# Patient Record
Sex: MALE | Race: WHITE | Employment: FULL TIME | URBAN - METROPOLITAN AREA
[De-identification: names, ages, dates, MRNs, and addresses within clinical notes are randomized per-mention and may not be internally consistent; named-entity substitution may affect disease eponyms.]

---

## 2017-07-17 PROBLEM — M17.0 PRIMARY OSTEOARTHRITIS OF BOTH KNEES: Status: ACTIVE | Noted: 2017-07-17

## 2017-09-20 PROBLEM — Z96.651 STATUS POST TOTAL RIGHT KNEE REPLACEMENT: Status: ACTIVE | Noted: 2017-09-20

## 2018-09-13 ENCOUNTER — TELEPHONE (OUTPATIENT)
Dept: ORTHOPEDIC SURGERY | Age: 63
End: 2018-09-13

## 2018-09-13 NOTE — TELEPHONE ENCOUNTER
Patient's wife called. Lazarus Peacock is having prostate biopsy next week and she is checking to make sure he does not need an antibiotic prior to procedure due to Hx of TKA.

## 2018-09-28 ENCOUNTER — HOSPITAL ENCOUNTER (OUTPATIENT)
Age: 63
Discharge: HOME | End: 2018-09-30

## 2018-09-28 PROCEDURE — 88342 IMHCHEM/IMCYTCHM 1ST ANTB: CPT

## 2018-09-28 PROCEDURE — 88305 TISSUE EXAM BY PATHOLOGIST: CPT

## 2018-09-28 PROCEDURE — 88341 IMHCHEM/IMCYTCHM EA ADD ANTB: CPT

## 2019-06-03 ENCOUNTER — APPOINTMENT (OUTPATIENT)
Dept: CT IMAGING | Age: 64
End: 2019-06-03
Payer: COMMERCIAL

## 2019-06-03 ENCOUNTER — APPOINTMENT (OUTPATIENT)
Dept: GENERAL RADIOLOGY | Age: 64
End: 2019-06-03
Payer: COMMERCIAL

## 2019-06-03 ENCOUNTER — HOSPITAL ENCOUNTER (EMERGENCY)
Age: 64
Discharge: HOME | End: 2019-06-03
Attending: EMERGENCY MEDICINE
Payer: COMMERCIAL

## 2019-06-03 VITALS
HEIGHT: 67 IN | HEART RATE: 49 BPM | WEIGHT: 197 LBS | TEMPERATURE: 96.8 F | BODY MASS INDEX: 30.92 KG/M2 | RESPIRATION RATE: 18 BRPM | OXYGEN SATURATION: 97 % | DIASTOLIC BLOOD PRESSURE: 82 MMHG | SYSTOLIC BLOOD PRESSURE: 160 MMHG

## 2019-06-03 DIAGNOSIS — H81.10 BENIGN PAROXYSMAL POSITIONAL VERTIGO, UNSPECIFIED LATERALITY: Primary | ICD-10-CM

## 2019-06-03 DIAGNOSIS — R55 NEAR SYNCOPE: ICD-10-CM

## 2019-06-03 LAB
ALBUMIN SERPL-MCNC: 4.6 G/DL (ref 3.5–5.2)
ALP BLD-CCNC: 56 U/L (ref 40–129)
ALT SERPL-CCNC: 26 U/L (ref 0–40)
ANION GAP SERPL CALCULATED.3IONS-SCNC: 10 MMOL/L (ref 7–16)
AST SERPL-CCNC: 28 U/L (ref 0–39)
BASOPHILS ABSOLUTE: 0.05 E9/L (ref 0–0.2)
BASOPHILS RELATIVE PERCENT: 0.8 % (ref 0–2)
BILIRUB SERPL-MCNC: 0.3 MG/DL (ref 0–1.2)
BUN BLDV-MCNC: 23 MG/DL (ref 8–23)
CALCIUM SERPL-MCNC: 9.6 MG/DL (ref 8.6–10.2)
CHLORIDE BLD-SCNC: 104 MMOL/L (ref 98–107)
CHOLESTEROL, TOTAL: 158 MG/DL (ref 0–199)
CO2: 26 MMOL/L (ref 22–29)
CREAT SERPL-MCNC: 0.8 MG/DL (ref 0.7–1.2)
EKG ATRIAL RATE: 53 BPM
EKG P AXIS: 31 DEGREES
EKG P-R INTERVAL: 218 MS
EKG Q-T INTERVAL: 398 MS
EKG QRS DURATION: 94 MS
EKG QTC CALCULATION (BAZETT): 373 MS
EKG R AXIS: 10 DEGREES
EKG T AXIS: 50 DEGREES
EKG VENTRICULAR RATE: 53 BPM
EOSINOPHILS ABSOLUTE: 0.24 E9/L (ref 0.05–0.5)
EOSINOPHILS RELATIVE PERCENT: 4 % (ref 0–6)
GFR AFRICAN AMERICAN: >60
GFR AFRICAN AMERICAN: >60
GFR NON-AFRICAN AMERICAN: >60 ML/MIN/1.73
GFR NON-AFRICAN AMERICAN: >60 ML/MIN/1.73
GLUCOSE BLD-MCNC: 123 MG/DL (ref 74–99)
GLUCOSE BLD-MCNC: 123 MG/DL (ref 74–99)
HBA1C MFR BLD: 5.9 % (ref 4–5.6)
HCT VFR BLD CALC: 41.5 % (ref 37–54)
HDLC SERPL-MCNC: 52 MG/DL
HEMOGLOBIN: 14.6 G/DL (ref 12.5–16.5)
IMMATURE GRANULOCYTES #: 0.02 E9/L
IMMATURE GRANULOCYTES %: 0.3 % (ref 0–5)
LDL CHOLESTEROL CALCULATED: 73 MG/DL (ref 0–99)
LYMPHOCYTES ABSOLUTE: 1.68 E9/L (ref 1.5–4)
LYMPHOCYTES RELATIVE PERCENT: 27.9 % (ref 20–42)
MCH RBC QN AUTO: 31.5 PG (ref 26–35)
MCHC RBC AUTO-ENTMCNC: 35.2 % (ref 32–34.5)
MCV RBC AUTO: 89.6 FL (ref 80–99.9)
MONOCYTES ABSOLUTE: 0.47 E9/L (ref 0.1–0.95)
MONOCYTES RELATIVE PERCENT: 7.8 % (ref 2–12)
NEUTROPHILS ABSOLUTE: 3.57 E9/L (ref 1.8–7.3)
NEUTROPHILS RELATIVE PERCENT: 59.2 % (ref 43–80)
PDW BLD-RTO: 11.8 FL (ref 11.5–15)
PERFORMED ON: ABNORMAL
PLATELET # BLD: 175 E9/L (ref 130–450)
PMV BLD AUTO: 9.9 FL (ref 7–12)
POC CHLORIDE: 109 MMOL/L (ref 100–108)
POC CREATININE: 0.7 MG/DL (ref 0.7–1.2)
POC POTASSIUM: 4 MMOL/L (ref 3.5–5)
POC SODIUM: 142 MMOL/L (ref 132–146)
POTASSIUM SERPL-SCNC: 4.1 MMOL/L (ref 3.5–5)
RBC # BLD: 4.63 E12/L (ref 3.8–5.8)
SODIUM BLD-SCNC: 140 MMOL/L (ref 132–146)
T4 FREE: 1.01 NG/DL (ref 0.93–1.7)
TOTAL PROTEIN: 7.5 G/DL (ref 6.4–8.3)
TRIGL SERPL-MCNC: 164 MG/DL (ref 0–149)
TROPONIN: <0.01 NG/ML (ref 0–0.03)
TSH SERPL DL<=0.05 MIU/L-ACNC: 1.71 UIU/ML (ref 0.27–4.2)
VLDLC SERPL CALC-MCNC: 33 MG/DL
WBC # BLD: 6 E9/L (ref 4.5–11.5)

## 2019-06-03 PROCEDURE — 99285 EMERGENCY DEPT VISIT HI MDM: CPT

## 2019-06-03 PROCEDURE — 36415 COLL VENOUS BLD VENIPUNCTURE: CPT

## 2019-06-03 PROCEDURE — 70498 CT ANGIOGRAPHY NECK: CPT

## 2019-06-03 PROCEDURE — 84484 ASSAY OF TROPONIN QUANT: CPT

## 2019-06-03 PROCEDURE — 93010 ELECTROCARDIOGRAM REPORT: CPT | Performed by: INTERNAL MEDICINE

## 2019-06-03 PROCEDURE — 71045 X-RAY EXAM CHEST 1 VIEW: CPT

## 2019-06-03 PROCEDURE — 6370000000 HC RX 637 (ALT 250 FOR IP): Performed by: EMERGENCY MEDICINE

## 2019-06-03 PROCEDURE — 93005 ELECTROCARDIOGRAM TRACING: CPT | Performed by: EMERGENCY MEDICINE

## 2019-06-03 PROCEDURE — 84132 ASSAY OF SERUM POTASSIUM: CPT

## 2019-06-03 PROCEDURE — 80061 LIPID PANEL: CPT

## 2019-06-03 PROCEDURE — 80053 COMPREHEN METABOLIC PANEL: CPT

## 2019-06-03 PROCEDURE — 82947 ASSAY GLUCOSE BLOOD QUANT: CPT

## 2019-06-03 PROCEDURE — 84443 ASSAY THYROID STIM HORMONE: CPT

## 2019-06-03 PROCEDURE — 84439 ASSAY OF FREE THYROXINE: CPT

## 2019-06-03 PROCEDURE — 84295 ASSAY OF SERUM SODIUM: CPT

## 2019-06-03 PROCEDURE — 70450 CT HEAD/BRAIN W/O DYE: CPT

## 2019-06-03 PROCEDURE — 83036 HEMOGLOBIN GLYCOSYLATED A1C: CPT

## 2019-06-03 PROCEDURE — 2580000003 HC RX 258: Performed by: EMERGENCY MEDICINE

## 2019-06-03 PROCEDURE — 82565 ASSAY OF CREATININE: CPT

## 2019-06-03 PROCEDURE — 2580000003 HC RX 258: Performed by: RADIOLOGY

## 2019-06-03 PROCEDURE — 6360000004 HC RX CONTRAST MEDICATION: Performed by: RADIOLOGY

## 2019-06-03 PROCEDURE — 85025 COMPLETE CBC W/AUTO DIFF WBC: CPT

## 2019-06-03 PROCEDURE — 70496 CT ANGIOGRAPHY HEAD: CPT

## 2019-06-03 PROCEDURE — 82435 ASSAY OF BLOOD CHLORIDE: CPT

## 2019-06-03 RX ORDER — MECLIZINE HCL 12.5 MG/1
25 TABLET ORAL ONCE
Status: COMPLETED | OUTPATIENT
Start: 2019-06-03 | End: 2019-06-03

## 2019-06-03 RX ORDER — SODIUM CHLORIDE 0.9 % (FLUSH) 0.9 %
10 SYRINGE (ML) INJECTION ONCE
Status: COMPLETED | OUTPATIENT
Start: 2019-06-03 | End: 2019-06-03

## 2019-06-03 RX ORDER — 0.9 % SODIUM CHLORIDE 0.9 %
1000 INTRAVENOUS SOLUTION INTRAVENOUS ONCE
Status: COMPLETED | OUTPATIENT
Start: 2019-06-03 | End: 2019-06-03

## 2019-06-03 RX ORDER — MECLIZINE HYDROCHLORIDE 25 MG/1
25 TABLET ORAL 3 TIMES DAILY PRN
Qty: 30 TABLET | Refills: 0 | Status: SHIPPED | OUTPATIENT
Start: 2019-06-03 | End: 2019-06-13

## 2019-06-03 RX ADMIN — IOPAMIDOL 60 ML: 755 INJECTION, SOLUTION INTRAVENOUS at 11:43

## 2019-06-03 RX ADMIN — SODIUM CHLORIDE 1000 ML: 9 INJECTION, SOLUTION INTRAVENOUS at 10:41

## 2019-06-03 RX ADMIN — MECLIZINE 25 MG: 12.5 TABLET ORAL at 10:10

## 2019-06-03 RX ADMIN — Medication 10 ML: at 11:43

## 2019-06-03 NOTE — ED NOTES
Bed: 11  Expected date:   Expected time:   Means of arrival:   Comments:     Tracy Johnson RN  06/03/19 0034

## 2019-06-03 NOTE — ED NOTES
ATTENDING PROVIDER ATTESTATION:     Tea Durant presented to the emergency department for evaluation of Loss of Consciousness (near syncope at work)   and was initially evaluated by the Medical Resident. See Original ED Note for H&P and ED course above. I have reviewed and discussed the case, including pertinent history (medical, surgical, family and social) and exam findings with the Medical Resident assigned to Tea Durant. I have personally performed and/or participated in the history, exam, medical decision making, and procedures and agree with all pertinent clinical information and any additional changes or corrections are noted below in my assessment and plan. I have discussed this patient in detail with the resident, and provided the instruction and education,       I have reviewed my findings and recommendations with the assigned Medical Resident, Tea Durant and members of family present at the time of disposition. Review of Systems:   Pertinent positives and negatives are stated within HPI, all other systems reviewed and are negative.    --------------------------------------------- PAST HISTORY ---------------------------------------------  Past Medical History:  has a past medical history of Arthritis, Hyperlipidemia, Hypertension, Motion sickness, Seasonal allergies, and Spondylisthesis. Past Surgical History:  has a past surgical history that includes knee surgery (Right, 1971); Achilles tendon surgery (Left, 1982); Appendectomy (2010); Colonoscopy; and joint replacement (Right, 09/11/2017). Social History:  reports that he has never smoked. He has never used smokeless tobacco. He reports that he drinks alcohol. He reports that he does not use drugs. Family History: family history includes Heart Disease in his father and paternal grandmother; Stroke in his maternal grandmother and mother. The patients home medications have been reviewed.     Allergies: Patient has no known allergies. Vertigo  Positional  No vertigo at rest  Slightly bradycardic but improved when he walked  Slightly HTN on arrival but says he is anxious and nervous and this happened before  No sx related to bradycardia  No pauses or blocks  CT/CTA neg for ICH, dissection, or occlusion. Tortious vessel on left vert but NO VBI sx and no occlusion or aneurysm. I discussed CT head with Milind from Neurosurgery, CT with normal/congenital variant and left vert is nothing acute and nothing to worry about. He improved with antivert in the ED  He feels completely better  Ambulated in the ED  HR improved to 70s with ambulation  No chest pain or sob  No syncope  Discussed reasons to return and other sx to look for  Nothing to suggest VBI or cerebellar stroke  Normal finger to nose  Normal heel to shin  No nystagmus  No focal deficits  Heart without m/g/r  No carotid bruit  EKG reassuring    1. Benign paroxysmal positional vertigo, unspecified laterality    2.  Near syncope           Evaline Sever, MD  06/03/19 1919

## 2019-06-03 NOTE — ED PROVIDER NOTES
syncope. Gastrointestinal: Positive for nausea. Negative for diarrhea and vomiting. Genitourinary: Negative for decreased urine volume, difficulty urinating, flank pain, frequency and urgency. Musculoskeletal: Positive for neck pain. Negative for back pain and neck stiffness. Skin: Negative for pallor and rash. Neurological: Positive for dizziness, weakness and light-headedness. Negative for tremors, syncope, facial asymmetry, speech difficulty, numbness and headaches. Psychiatric/Behavioral: Negative for confusion. Physical Exam   Constitutional: He is oriented to person, place, and time. He appears well-developed and well-nourished. No distress. HENT:   Head: Normocephalic and atraumatic. Right Ear: External ear normal.   Left Ear: External ear normal.   Nose: Nose normal.   Mouth/Throat: Oropharynx is clear and moist.   Eyes: Pupils are equal, round, and reactive to light. Conjunctivae and EOM are normal.   No nystagmus present   Neck: Normal range of motion. Neck supple. No tracheal deviation present. Cardiovascular: Regular rhythm and normal heart sounds. Bradycardia present. No murmur heard. Pulmonary/Chest: Effort normal and breath sounds normal. No respiratory distress. He has no wheezes. He has no rales. Abdominal: Soft. Bowel sounds are normal. There is no tenderness. There is no rebound and no guarding. Musculoskeletal: He exhibits no edema. Lymphadenopathy:     He has no cervical adenopathy. Neurological: He is alert and oriented to person, place, and time. No cranial nerve deficit. Gait abnormal. Coordination normal.   Patient unsteady on sitting up   Skin: Skin is warm and dry. He is not diaphoretic. Nursing note and vitals reviewed.       Procedures    MDM  Number of Diagnoses or Management Options  Benign paroxysmal positional vertigo, unspecified laterality:   Near syncope:   Diagnosis management comments: Patient is a 57-year-old male that presents emergency -------------------------------------------------  Labs:  Results for orders placed or performed during the hospital encounter of 06/03/19   CBC Auto Differential   Result Value Ref Range    WBC 6.0 4.5 - 11.5 E9/L    RBC 4.63 3.80 - 5.80 E12/L    Hemoglobin 14.6 12.5 - 16.5 g/dL    Hematocrit 41.5 37.0 - 54.0 %    MCV 89.6 80.0 - 99.9 fL    MCH 31.5 26.0 - 35.0 pg    MCHC 35.2 (H) 32.0 - 34.5 %    RDW 11.8 11.5 - 15.0 fL    Platelets 244 001 - 457 E9/L    MPV 9.9 7.0 - 12.0 fL    Neutrophils % 59.2 43.0 - 80.0 %    Immature Granulocytes % 0.3 0.0 - 5.0 %    Lymphocytes % 27.9 20.0 - 42.0 %    Monocytes % 7.8 2.0 - 12.0 %    Eosinophils % 4.0 0.0 - 6.0 %    Basophils % 0.8 0.0 - 2.0 %    Neutrophils # 3.57 1.80 - 7.30 E9/L    Immature Granulocytes # 0.02 E9/L    Lymphocytes # 1.68 1.50 - 4.00 E9/L    Monocytes # 0.47 0.10 - 0.95 E9/L    Eosinophils # 0.24 0.05 - 0.50 E9/L    Basophils # 0.05 0.00 - 0.20 E9/L   Comprehensive Metabolic Panel   Result Value Ref Range    Sodium 140 132 - 146 mmol/L    Potassium 4.1 3.5 - 5.0 mmol/L    Chloride 104 98 - 107 mmol/L    CO2 26 22 - 29 mmol/L    Anion Gap 10 7 - 16 mmol/L    Glucose 123 (H) 74 - 99 mg/dL    BUN 23 8 - 23 mg/dL    CREATININE 0.8 0.7 - 1.2 mg/dL    GFR Non-African American >60 >=60 mL/min/1.73    GFR African American >60     Calcium 9.6 8.6 - 10.2 mg/dL    Total Protein 7.5 6.4 - 8.3 g/dL    Alb 4.6 3.5 - 5.2 g/dL    Total Bilirubin 0.3 0.0 - 1.2 mg/dL    Alkaline Phosphatase 56 40 - 129 U/L    ALT 26 0 - 40 U/L    AST 28 0 - 39 U/L   Troponin   Result Value Ref Range    Troponin <0.01 0.00 - 0.03 ng/mL   TSH without Reflex   Result Value Ref Range    TSH 1.710 0.270 - 4.200 uIU/mL   T4, FREE   Result Value Ref Range    T4 Free 1.01 0.93 - 1.70 ng/dL   Hemoglobin A1c   Result Value Ref Range    Hemoglobin A1C 5.9 (H) 4.0 - 5.6 %   Lipid panel   Result Value Ref Range    Cholesterol, Total 158 0 - 199 mg/dL    Triglycerides 164 (H) 0 - 149 mg/dL    HDL 52 >40 mg/dL    LDL Calculated 73 0 - 99 mg/dL    VLDL Cholesterol Calculated 33 mg/dL   POCT Venous   Result Value Ref Range    POC Sodium 142 132 - 146 mmol/L    POC Potassium 4.0 3.5 - 5.0 mmol/L    POC Chloride 109 (H) 100 - 108 mmol/L    POC Glucose 123 (H) 74 - 99 mg/dl    POC Creatinine 0.7 0.7 - 1.2 mg/dL    GFR Non-African American >60 >=60 mL/min/1.73    GFR  >60     Performed on SEE BELOW    EKG 12 Lead   Result Value Ref Range    Ventricular Rate 53 BPM    Atrial Rate 53 BPM    P-R Interval 218 ms    QRS Duration 94 ms    Q-T Interval 398 ms    QTc Calculation (Bazett) 373 ms    P Axis 31 degrees    R Axis 10 degrees    T Axis 50 degrees       Radiology:  CTA Neck W Contrast   Final Result   1. No significant arterial inflow disease demonstrated in the neck. 2. Focus of diminished attenuation in the left basal ganglia which may   represent a dilated perivascular space. 3. Small junctional dilatation at the origin of the left posterior   communicating artery. These infrequently give rise to hemorrhage. CTA Head W Contrast   Final Result   1. No significant arterial inflow disease demonstrated in the neck. 2. Focus of diminished attenuation in the left basal ganglia which may   represent a dilated perivascular space. 3. Small junctional dilatation at the origin of the left posterior   communicating artery. These infrequently give rise to hemorrhage. CT Head WO Contrast   Final Result   1. No significant arterial inflow disease demonstrated in the neck. 2. Focus of diminished attenuation in the left basal ganglia which may   represent a dilated perivascular space. 3. Small junctional dilatation at the origin of the left posterior   communicating artery. These infrequently give rise to hemorrhage.          XR CHEST PORTABLE   Final Result   Minimal atelectasis in the right lung base.                ------------------------- NURSING NOTES AND VITALS REVIEWED ---------------------------  Date / Time Roomed:  6/3/2019  9:38 AM  ED Bed Assignment:  WALT/WALT    The nursing notes within the ED encounter and vital signs as below have been reviewed. BP (!) 160/82   Pulse (!) 49   Temp 96.8 °F (36 °C)   Resp 18   Ht 5' 7\" (1.702 m)   Wt 197 lb (89.4 kg)   SpO2 97%   BMI 30.85 kg/m²   Oxygen Saturation Interpretation: Normal      ------------------------------------------ PROGRESS NOTES ------------------------------------------  1:37 PM  I have spoken with the patient and discussed todays results, in addition to providing specific details for the plan of care and counseling regarding the diagnosis and prognosis. Their questions are answered at this time and they are agreeable with the plan. I discussed at length with them reasons for immediate return here for re evaluation. They will followup with their primary care physician by calling their office tomorrow. --------------------------------- ADDITIONAL PROVIDER NOTES ---------------------------------  At this time the patient is without objective evidence of an acute process requiring hospitalization or inpatient management. They have remained hemodynamically stable throughout their entire ED visit and are stable for discharge with outpatient follow-up. The plan has been discussed in detail and they are aware of the specific conditions for emergent return, as well as the importance of follow-up. Discharge Medication List as of 6/3/2019  1:39 PM      START taking these medications    Details   meclizine (ANTIVERT) 25 MG tablet Take 1 tablet by mouth 3 times daily as needed for Dizziness or Nausea, Disp-30 tablet, R-0Print             Diagnosis:  1. Benign paroxysmal positional vertigo, unspecified laterality    2. Near syncope        Disposition:  Patient's disposition: Discharge to home  Patient's condition is stable.        Mackenzie Zaragoza., DO  Resident  06/04/19 [address] [facility] [address] Alvaro Taylor., DO  Resident  06/04/19 5109

## 2019-06-04 ASSESSMENT — ENCOUNTER SYMPTOMS
VISUAL CHANGE: 0
SHORTNESS OF BREATH: 0
TROUBLE SWALLOWING: 0
BACK PAIN: 0
VOMITING: 0
NAUSEA: 1
DIARRHEA: 0
CHEST TIGHTNESS: 0
PHOTOPHOBIA: 0
COUGH: 0
VOICE CHANGE: 0
WHEEZING: 0

## 2019-07-24 ENCOUNTER — HOSPITAL ENCOUNTER (OUTPATIENT)
Age: 64
Discharge: HOME | End: 2019-07-26
Payer: COMMERCIAL

## 2019-07-24 LAB — PROSTATE SPECIFIC ANTIGEN: 5.56 NG/ML (ref 0–4)

## 2019-07-24 PROCEDURE — G0103 PSA SCREENING: HCPCS

## 2022-08-12 ENCOUNTER — HOSPITAL ENCOUNTER (OUTPATIENT)
Dept: CT IMAGING | Age: 67
Discharge: HOME | End: 2022-08-14
Payer: COMMERCIAL

## 2022-08-12 ENCOUNTER — HOSPITAL ENCOUNTER (OUTPATIENT)
Age: 67
Discharge: HOME | End: 2022-08-14
Payer: COMMERCIAL

## 2022-08-12 DIAGNOSIS — S09.90XA INJURY OF HEAD, INITIAL ENCOUNTER: ICD-10-CM

## 2022-08-12 PROCEDURE — 70450 CT HEAD/BRAIN W/O DYE: CPT

## 2025-04-15 ENCOUNTER — HOSPITAL ENCOUNTER (OUTPATIENT)
Age: 70
Discharge: HOME | End: 2025-04-17

## 2025-04-23 LAB — SURGICAL PATHOLOGY REPORT: NORMAL

## 2025-05-09 LAB — SURGICAL PATHOLOGY REPORT: NORMAL

## 2025-06-02 ENCOUNTER — TELEPHONE (OUTPATIENT)
Dept: CASE MANAGEMENT | Age: 70
End: 2025-06-02

## 2025-06-02 ENCOUNTER — HOSPITAL ENCOUNTER (OUTPATIENT)
Dept: RADIATION ONCOLOGY | Age: 70
Discharge: HOME | End: 2025-06-02
Payer: COMMERCIAL

## 2025-06-02 VITALS
BODY MASS INDEX: 28.24 KG/M2 | SYSTOLIC BLOOD PRESSURE: 157 MMHG | HEART RATE: 71 BPM | DIASTOLIC BLOOD PRESSURE: 81 MMHG | RESPIRATION RATE: 18 BRPM | WEIGHT: 180.3 LBS | TEMPERATURE: 97.4 F

## 2025-06-02 DIAGNOSIS — C61 PROSTATE CANCER (HCC): Primary | ICD-10-CM

## 2025-06-02 PROCEDURE — 99205 OFFICE O/P NEW HI 60 MIN: CPT

## 2025-06-02 NOTE — PROGRESS NOTES
Mando Krishna  1955 70 y.o.      Referring Physician: Summer    PCP: Peña Arriola MD     Vitals:    06/02/25 0812   BP: (!) 157/81   Pulse: 71   Resp: 18   Temp: 97.4 °F (36.3 °C)        Wt Readings from Last 3 Encounters:   06/02/25 81.8 kg (180 lb 4.8 oz)   06/03/19 89.4 kg (197 lb)   12/21/17 84.8 kg (187 lb)        Body mass index is 28.24 kg/m².               Chief Complaint: No chief complaint on file.         Cancer Staging   No matching staging information was found for the patient.      Prior Radiation Therapy? NO    Concurrent Chemo/radiation? NO    Prior Chemotherapy? NO    Prior Hormonal Therapy? YES: Site Treated: prostate          Facility: na          Date: present     Head and Neck Cancer? No, patient does NOT have HN cancer.            Current Outpatient Medications   Medication Sig Dispense Refill    ketorolac (TORADOL) 10 MG tablet Take 1 tablet by mouth every 6 hours 20 tablet 0    NAPROXEN PO Take 220 mg by mouth daily as needed Takes 2 tabs daily prn      Fexofenadine HCl (ALLEGRA PO) Take 180 mg by mouth daily       Ascorbic Acid (VITAMIN C) 500 MG tablet Take 1,000 mg by mouth daily       valsartan-hydrochlorothiazide (DIOVAN-HCT) 160-12.5 MG per tablet Take 1 tablet by mouth daily       therapeutic multivitamin-minerals (THERAGRAN-M) tablet Take 1 tablet by mouth daily LD 9/1/2017      rosuvastatin (CRESTOR) 10 MG tablet Take 10 mg by mouth daily.       No current facility-administered medications for this encounter.       Past Medical History:   Diagnosis Date    Arthritis     osteo    Hyperlipidemia     Hypertension     Motion sickness     Seasonal allergies     Spondylisthesis     at lumbar with spinal stenosis       Past Surgical History:   Procedure Laterality Date    ACHILLES TENDON SURGERY Left 1982    APPENDECTOMY  2010    COLONOSCOPY      JOINT REPLACEMENT Right 09/11/2017    Rt. TKA, Dr. LENCHO Daniels    KNEE SURGERY Right 1971    Eryn david       Family History  Educate patient/family/caregiver on falls prevention  6.  Falls risk precaution (Yellow sticker Level II) placed on patient chart   7 or   Higher High Risk 1.  Place patient in easily observable treatment room  2.  Patient attended at all times by family member or staff  3.  Provide assistance as indicated for ambulation activities  4.  Reorient confused/cognitively impaired patient  5.  Call-light/bell within patient's reach  6.  Chair/bed in low position, stretcher/bed with siderails up except when performing patient care activities  7.  Educate patient/family/caregiver on falls prevention  8.  Falls risk precaution (Yellow sticker Level III) placed on patient chart           MALNUTRITION RISK SCREENING ASSESSMENT    Instructions:  Assess the patient and enter the appropriate indicators that are present for nutrition risk identification. Total the numbers entered and assign a risk score. Follow the appropriate action for total score listed below.     Assessment   Date  6/2/2025     Have you lost weight without trying?      0- No     Have you been eating poorly because of a decreased appetite?         0- No   3. Do you have a diagnosis of head and neck cancer OR will you be receiving neoadjuvant treatment for breast cancer?      0- No                                                                                    TOTAL 0          Score of 0-1: No action  Score 2 or greater:  For Non-Diabetic Patient: Recommend adding Ensure Complete 2 x daily and provide patient with Ensure wellness bag with coupons  For Diabetic Patient: Recommend adding Glucerna Shake 2 x daily and provide patient with Glucerna Wellness bag with coupons  Route to the dietitian via [facility]          OT ASSESSMENT FOR REFERRAL    Are you having difficulty performing daily routine tasks due to fatigue or weakness (ie: bathing/showering, dressing, housework, meal prep, work, , etc): No     Do you have any arm flexibility/ROM restrictions,

## 2025-06-02 NOTE — PROGRESS NOTES
Radiation Oncology      Suman Mills III, MD MS DABR   [facility]      Referring Physician: Dr. 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  Patient has a history of elevated PSA's starting in 2018. 6/11/18 6.1, 7/27/18 5.0, 7/24/19 5.56, 2/23/21 7.2, 7/13/21 6.5, 1/10/22 6.1,7/8/22 7.7, 1/20/23 8.48, 6/2/23 9.40, 9/7/23 9.15, 2/1/24 9.4, 8/15/24 9.66, 12/19/24 9.89, 2/20/25 11.07, 3/7/25 11.66. Patient had a biopsy done 9/28/18 that was negative. Patient had an Prostate MRI done 3/19/21 and 6/29/23 that was negative. A prostate biopsy was done 4/25/25 pathology came back as intermediate adenocarcinoma deandre 3+4=7, [id number]. A bone scan was done on 5/7/25 and it was negative. Patient was referred here by Dr. Isra Harrell who recommends XRT with 6 months LHRH agonist therapy and space-OAR jaimie gel injection.  The patient presents today to discuss fractionated external beam radiation therapy as a component of multidisciplinary, definative management.  We reviewed the available medical records including the complete medical history of this pt today prior to consultation. Epic -CE and available scanned documents per the Epic Media tab were reviewed PRN.  A complete ROS was also performed today and is noted below.  During consultation today I personally discussed the pts workup to date; including but not limited to applicable imaging studies, Pathology reports, and interventions.  The NCCN guidelines, as pertaining to the above diagnosis were also recapped for the pt today in brief.  Today, Mando Krishna  notes Sx that include nocturia.   KPS  ROS: {:[id number]}  Dermatological ROS: {:[id number]}        Physical Exam        Imaging reviewed:      NMBS 5/7/25: NEG      Radiation Safety and Treatment Support:  -previous Radiation history: {YES / NO:19727}  -history of connective tissue disease: {YES / NO:19727}  -history of autoimmune disease: {YES / NO:19727}  -pregnant: {Response;yes/no/na:63}  -fertility conservation and /or contraception discussed: {Response;yes/no/na:63}  -nutrition consult prior to TX: Yes  -PEG: {YES / NO:19727}  -Dental evaluation prior to treatment:{YES / NO:19727}  -Social Work requested: {YES / NO:19727}  -Oncology Nurse Navigator requested: Yes  -pre + post treatment PT / Rehab / PM+R evaluation considered: Yes  -ICD: {YES / NO:19727}   -ICD brand: ***  -[facility] patient navigator: Deana Spicer[[phone]]  -Nurse Practitioners for Radiation Oncology:    ---Wicho Guzmán, MSN, RN, FNP-C   ---Khalida Donahue, MSN, RN, FNP-BC        Assessment and Plan: *** is a pleasant and cooperative *** year old with a recent diagnosis of AJCC stage group *** (*** *** ***).        It was a pleasure meeting *** today and we appreciate the referral and opportunity to be involved in {Desc; his/her:51042} care.  We had an extensive discussion today regarding the course to date (including a focused review of the applicable radiographic and laboratory information), multidisciplinary approach to cancer care, and indications for external beam radiation therapy as a component therein.*** A literature review and multidisciplinary discussion was performed *** seeing this patient due to the complexity of the medical decision making in this case. I personally spent greater than *** minutes on this case and with this patient. I performed the complete history and physical as above at today's visit, at least 45 minutes was in direct discussion and  regarding disease management.          Suman Mills III, MD MS [facility]  [facility]  Radiation Oncology  Cell:

## 2025-06-18 ENCOUNTER — TELEPHONE (OUTPATIENT)
Dept: CASE MANAGEMENT | Age: 70
End: 2025-06-18

## 2025-06-18 NOTE — TELEPHONE ENCOUNTER
Received a call from patient with questions on next steps and scheduling of Space OAR. Updated patient that I would follow up with Urology and update them that we are ready for patient to be scheduled for Space OAR. Consult note faxed to [facility]. Called [facility] and updated them that the consult note was faxed to their office and patient is being recommended for Space OAR. They will update Surgery scheduling and patient will be scheduled accordingly.

## 2025-06-20 ENCOUNTER — TELEPHONE (OUTPATIENT)
Dept: CASE MANAGEMENT | Age: 70
End: 2025-06-20

## 2025-06-20 NOTE — TELEPHONE ENCOUNTER
Received a call from patient with an update on Space OAR. He is now scheduled with Urology for Space OAR procedure on 7/1/25. Will update the office and patient will be scheduled for CT Simulation for Radiation planning accordingly with a wait of atleast 7-10 days after procedure. Patient also discussed a prior engagement the week of 8/25/25. Will update Dr Mills to check on timing of start of treatment after CT simulation and possibility of treatment being completed before 8/24/25.

## 2025-06-30 ENCOUNTER — TELEPHONE (OUTPATIENT)
Dept: CASE MANAGEMENT | Age: 70
End: 2025-06-30

## 2025-06-30 NOTE — TELEPHONE ENCOUNTER
Received Disability paperwork for patient. Forms were completed, signed and faxed to Jacobo DAMIAN,  at [phone].

## 2025-07-01 ENCOUNTER — HOSPITAL ENCOUNTER (OUTPATIENT)
Age: 70
Discharge: HOME | End: 2025-07-03

## 2025-07-01 LAB
PSA SERPL-MCNC: 1.46 NG/ML (ref 0–4)
TESTOST SERPL-MCNC: <3 NG/DL (ref 193–740)

## 2025-07-01 PROCEDURE — 84403 ASSAY OF TOTAL TESTOSTERONE: CPT

## 2025-07-01 PROCEDURE — G0103 PSA SCREENING: HCPCS

## 2025-07-07 ENCOUNTER — HOSPITAL ENCOUNTER (OUTPATIENT)
Dept: RADIATION ONCOLOGY | Age: 70
Discharge: HOME | End: 2025-07-07
Payer: COMMERCIAL

## 2025-07-07 PROCEDURE — 77334 RADIATION TREATMENT AID(S): CPT | Performed by: RADIOLOGY

## 2025-07-14 ENCOUNTER — HOSPITAL ENCOUNTER (OUTPATIENT)
Dept: RADIATION ONCOLOGY | Age: 70
Discharge: HOME | End: 2025-07-14
Payer: COMMERCIAL

## 2025-07-21 ENCOUNTER — HOSPITAL ENCOUNTER (OUTPATIENT)
Dept: RADIATION ONCOLOGY | Age: 70
Discharge: HOME | End: 2025-07-21
Payer: COMMERCIAL

## 2025-07-21 PROCEDURE — 77385 HC NTSTY MODUL RAD TX DLVR SMPL: CPT | Performed by: RADIOLOGY

## 2025-07-22 ENCOUNTER — HOSPITAL ENCOUNTER (OUTPATIENT)
Dept: RADIATION ONCOLOGY | Age: 70
Discharge: HOME | End: 2025-07-22
Payer: COMMERCIAL

## 2025-07-22 PROCEDURE — 77385 HC NTSTY MODUL RAD TX DLVR SMPL: CPT | Performed by: RADIOLOGY

## 2025-07-23 ENCOUNTER — HOSPITAL ENCOUNTER (OUTPATIENT)
Dept: RADIATION ONCOLOGY | Age: 70
Discharge: HOME | End: 2025-07-23
Payer: COMMERCIAL

## 2025-07-23 VITALS
SYSTOLIC BLOOD PRESSURE: 168 MMHG | TEMPERATURE: 97.6 F | HEART RATE: 60 BPM | RESPIRATION RATE: 18 BRPM | WEIGHT: 184.3 LBS | OXYGEN SATURATION: 97 % | BODY MASS INDEX: 28.87 KG/M2 | DIASTOLIC BLOOD PRESSURE: 83 MMHG

## 2025-07-23 DIAGNOSIS — C61 PROSTATE CANCER (HCC): Primary | ICD-10-CM

## 2025-07-23 PROCEDURE — 77385 HC NTSTY MODUL RAD TX DLVR SMPL: CPT | Performed by: RADIOLOGY

## 2025-07-23 NOTE — PROGRESS NOTES
DEPARTMENT OF RADIATION ONCOLOGY ON TREATMENT VISIT         7/23/2025      NAME:  Mando Krishna    YOB: 1955    Diagnosis: prostate cancer    SUBJECTIVE:   Mando Krishna has now received fractionated external beam radiation therapy - ongoing.    Past medical, surgical, social and family histories reviewed and updated as indicated.    Pain: controlled    ALLERGIES:  Patient has no known allergies.         Current Outpatient Medications   Medication Sig Dispense Refill    ketorolac (TORADOL) 10 MG tablet Take 1 tablet by mouth every 6 hours 20 tablet 0    NAPROXEN PO Take 220 mg by mouth daily as needed Takes 2 tabs daily prn      Fexofenadine HCl (ALLEGRA PO) Take 180 mg by mouth daily       Ascorbic Acid (VITAMIN C) 500 MG tablet Take 1,000 mg by mouth daily       valsartan-hydrochlorothiazide (DIOVAN-HCT) 160-12.5 MG per tablet Take 1 tablet by mouth daily       therapeutic multivitamin-minerals (THERAGRAN-M) tablet Take 1 tablet by mouth daily LD 9/1/2017      rosuvastatin (CRESTOR) 10 MG tablet Take 10 mg by mouth daily.       No current facility-administered medications for this encounter.           OBJECTIVE:  Alert and fully ambulatory. Pleasant and conversant.        Physical Examination: General appearance - alert, well appearing, and in no distress.          Wt Readings from Last 3 Encounters:   07/23/25 83.6 kg (184 lb 4.8 oz)   06/02/25 81.8 kg (180 lb 4.8 oz)   06/03/19 89.4 kg (197 lb)         ASSESSMENT/PLAN:     Patient is tolerating treatments well with expected toxicities. RBA were reviewed prior to first fraction and PRN.    Current and planned dose reviewed. Goals of treatment and potential side effects were reviewed with the patient PRN. Treatment imaging has been personally reviewed for accuracy and precision.    Questions answered to apparent satisfaction.    Treatments will continue as planned.      Suman Mills III, MD MS DABR  Radiation Oncologist        BET[facility]

## 2025-07-23 NOTE — PROGRESS NOTES
Mando Krishna  7/23/2025  Wt Readings from Last 3 Encounters:   07/23/25 83.6 kg (184 lb 4.8 oz)   06/02/25 81.8 kg (180 lb 4.8 oz)   06/03/19 89.4 kg (197 lb)     Body mass index is 28.87 kg/m².        Treatment Area:prostate    Patient was seen today for weekly visit.     Comfort Alteration  KPS:90%  Fatigue: Mld    Nutritional Alteration  Anorexia: No  Nausea: No  Vomiting: No     Elimination Alterations  Constipation: no  Diarrhea:  no  Urinary Frequency/Urgency: Yes  Urinary Retention: No  Dysuria: No  Urinary Incontinence: No  Proctitis: No  Nocturia: Yes #/night: 2     Skin Alteration   Sensation:no    Radiation Dermatitis:  no    Emotional  Coping: effective    Sexuality Alteration  na    Injury, potential bleeding or infection: no    Lab Results   Component Value Date    WBC 6.0 06/03/2019    HGB 14.6 06/03/2019    HCT 41.5 06/03/2019     06/03/2019         BP (!) 168/83   Pulse 60   Temp 97.6 °F (36.4 °C) (Temporal)   Resp 18   Wt 83.6 kg (184 lb 4.8 oz)   SpO2 97%   BMI 28.87 kg/m²   BP within normal range? no   -if no, manually recheck in 5-10 min  NEW BP reading:  BP within normal range? yes   -if no, notify attending provider for further instruction      Assessment/Plan: Pt completed 3/28fx and 150/7000cGy.    Janessa Lemus RN

## 2025-07-24 ENCOUNTER — HOSPITAL ENCOUNTER (OUTPATIENT)
Dept: RADIATION ONCOLOGY | Age: 70
Discharge: HOME | End: 2025-07-24
Payer: COMMERCIAL

## 2025-07-24 PROCEDURE — 77385 HC NTSTY MODUL RAD TX DLVR SMPL: CPT | Performed by: RADIOLOGY

## 2025-07-25 ENCOUNTER — HOSPITAL ENCOUNTER (OUTPATIENT)
Dept: RADIATION ONCOLOGY | Age: 70
Discharge: HOME | End: 2025-07-25
Payer: COMMERCIAL

## 2025-07-25 PROCEDURE — 77385 HC NTSTY MODUL RAD TX DLVR SMPL: CPT | Performed by: RADIOLOGY

## 2025-07-25 PROCEDURE — 77336 RADIATION PHYSICS CONSULT: CPT | Performed by: RADIOLOGY

## 2025-07-28 ENCOUNTER — HOSPITAL ENCOUNTER (OUTPATIENT)
Dept: RADIATION ONCOLOGY | Age: 70
Discharge: HOME | End: 2025-07-28
Payer: COMMERCIAL

## 2025-07-28 PROCEDURE — 77385 HC NTSTY MODUL RAD TX DLVR SMPL: CPT | Performed by: RADIOLOGY

## 2025-07-28 PROCEDURE — 77014 CHG CT GUIDANCE RADIATION THERAPY FLDS PLACEMENT: CPT | Performed by: RADIOLOGY

## 2025-07-29 ENCOUNTER — HOSPITAL ENCOUNTER (OUTPATIENT)
Dept: RADIATION ONCOLOGY | Age: 70
Discharge: HOME | End: 2025-07-29
Payer: COMMERCIAL

## 2025-07-29 PROCEDURE — 77385 HC NTSTY MODUL RAD TX DLVR SMPL: CPT | Performed by: RADIOLOGY

## 2025-07-30 ENCOUNTER — HOSPITAL ENCOUNTER (OUTPATIENT)
Dept: RADIATION ONCOLOGY | Age: 70
Discharge: HOME | End: 2025-07-30
Payer: COMMERCIAL

## 2025-07-30 VITALS
OXYGEN SATURATION: 96 % | RESPIRATION RATE: 18 BRPM | HEART RATE: 68 BPM | SYSTOLIC BLOOD PRESSURE: 182 MMHG | DIASTOLIC BLOOD PRESSURE: 81 MMHG | BODY MASS INDEX: 28.58 KG/M2 | TEMPERATURE: 97.6 F | WEIGHT: 182.5 LBS

## 2025-07-30 PROCEDURE — 77385 HC NTSTY MODUL RAD TX DLVR SMPL: CPT | Performed by: RADIOLOGY

## 2025-07-30 NOTE — PROGRESS NOTES
Mando Krishna  7/30/2025  Wt Readings from Last 3 Encounters:   07/30/25 82.8 kg (182 lb 8 oz)   07/23/25 83.6 kg (184 lb 4.8 oz)   06/02/25 81.8 kg (180 lb 4.8 oz)     Body mass index is 28.58 kg/m².        Treatment Area:ctv prostate     Patient was seen today for weekly visit.     Comfort Alteration  KPS:90%  Fatigue: Mild    Nutritional Alteration  Anorexia: No  Nausea: No  Vomiting: No     Elimination Alterations  Constipation: no  Diarrhea:  no  Urinary Frequency/Urgency: Yes  Urinary Retention: No  Dysuria: No  Urinary Incontinence: No  Proctitis: Yes  Nocturia: Yes #/night: 1     Skin Alteration   Sensation:no    Radiation Dermatitis:  no    Emotional  Coping: effective    Sexuality Alteration  na    Injury, potential bleeding or infection: no    Lab Results   Component Value Date    WBC 6.0 06/03/2019    HGB 14.6 06/03/2019    HCT 41.5 06/03/2019     06/03/2019         BP (!) 182/81   Pulse 68   Temp 97.6 °F (36.4 °C) (Temporal)   Resp 18   Wt 82.8 kg (182 lb 8 oz)   SpO2 96%   BMI 28.58 kg/m²   BP within normal range? no   -if no, manually recheck in 5-10 min  NEW BP reading:  BP within normal range? yes   -if no, notify attending provider for further instruction      Assessment/Plan: Pt completed 8/28fx and 2000/7000cGy.     Janessa Lemus RN

## 2025-07-30 NOTE — ON TREATMENT VISIT
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 He has received 8 treatments and 2000 cGy of a planned course of 7000 cGy.  He is tolerating treatment reasonably well.  He has noticed increased nocturia and his stream is weaker at night.  He stopped drinking fluids after 6 PM and he thinks this has helped with the frequency.  He is fatigued but he thinks this is related to the frequent awakening at night to urinate.  He does not have any complaints.  He did not have urinary obstructive symptoms prior to the radiation treatment.  We will see how this responds to reduce fluid intake in the evening

## 2025-07-31 ENCOUNTER — HOSPITAL ENCOUNTER (OUTPATIENT)
Dept: RADIATION ONCOLOGY | Age: 70
Discharge: HOME | End: 2025-07-31
Payer: COMMERCIAL

## 2025-07-31 PROCEDURE — 77385 HC NTSTY MODUL RAD TX DLVR SMPL: CPT | Performed by: RADIOLOGY

## 2025-08-01 ENCOUNTER — HOSPITAL ENCOUNTER (OUTPATIENT)
Dept: RADIATION ONCOLOGY | Age: 70
Discharge: HOME | End: 2025-08-01
Payer: COMMERCIAL

## 2025-08-01 PROCEDURE — 77385 HC NTSTY MODUL RAD TX DLVR SMPL: CPT | Performed by: RADIOLOGY

## 2025-08-01 PROCEDURE — 77336 RADIATION PHYSICS CONSULT: CPT | Performed by: RADIOLOGY

## 2025-08-04 ENCOUNTER — HOSPITAL ENCOUNTER (OUTPATIENT)
Dept: RADIATION ONCOLOGY | Age: 70
Discharge: HOME | End: 2025-08-04
Payer: COMMERCIAL

## 2025-08-04 PROCEDURE — 77385 HC NTSTY MODUL RAD TX DLVR SMPL: CPT | Performed by: RADIOLOGY

## 2025-08-05 ENCOUNTER — HOSPITAL ENCOUNTER (OUTPATIENT)
Dept: RADIATION ONCOLOGY | Age: 70
Discharge: HOME | End: 2025-08-05
Payer: COMMERCIAL

## 2025-08-05 PROCEDURE — 77385 HC NTSTY MODUL RAD TX DLVR SMPL: CPT | Performed by: RADIOLOGY

## 2025-08-06 ENCOUNTER — HOSPITAL ENCOUNTER (OUTPATIENT)
Dept: RADIATION ONCOLOGY | Age: 70
Discharge: HOME | End: 2025-08-06
Payer: COMMERCIAL

## 2025-08-06 VITALS
RESPIRATION RATE: 18 BRPM | BODY MASS INDEX: 28.72 KG/M2 | DIASTOLIC BLOOD PRESSURE: 88 MMHG | SYSTOLIC BLOOD PRESSURE: 158 MMHG | WEIGHT: 183.4 LBS | OXYGEN SATURATION: 98 % | HEART RATE: 66 BPM | TEMPERATURE: 96.9 F

## 2025-08-06 DIAGNOSIS — C61 PROSTATE CANCER (HCC): Primary | ICD-10-CM

## 2025-08-06 PROCEDURE — 77385 HC NTSTY MODUL RAD TX DLVR SMPL: CPT | Performed by: RADIOLOGY

## 2025-08-07 ENCOUNTER — HOSPITAL ENCOUNTER (OUTPATIENT)
Dept: RADIATION ONCOLOGY | Age: 70
Discharge: HOME | End: 2025-08-07
Payer: COMMERCIAL

## 2025-08-07 PROCEDURE — 77385 HC NTSTY MODUL RAD TX DLVR SMPL: CPT | Performed by: RADIOLOGY

## 2025-08-08 ENCOUNTER — HOSPITAL ENCOUNTER (OUTPATIENT)
Dept: RADIATION ONCOLOGY | Age: 70
Discharge: HOME | End: 2025-08-08
Payer: COMMERCIAL

## 2025-08-08 PROCEDURE — 77385 HC NTSTY MODUL RAD TX DLVR SMPL: CPT | Performed by: RADIOLOGY

## 2025-08-08 PROCEDURE — 77336 RADIATION PHYSICS CONSULT: CPT | Performed by: RADIOLOGY

## 2025-08-11 ENCOUNTER — HOSPITAL ENCOUNTER (OUTPATIENT)
Dept: RADIATION ONCOLOGY | Age: 70
Discharge: HOME | End: 2025-08-11
Payer: COMMERCIAL

## 2025-08-11 PROCEDURE — 77385 HC NTSTY MODUL RAD TX DLVR SMPL: CPT | Performed by: RADIOLOGY

## 2025-08-11 RX ORDER — HYDROCORTISONE ACETATE SUPPOSITORY 30 MG/1
30 SUPPOSITORY RECTAL 4 TIMES DAILY
Qty: 14 SUPPOSITORY | Refills: 2 | Status: SHIPPED | OUTPATIENT
Start: 2025-08-11

## 2025-08-12 ENCOUNTER — HOSPITAL ENCOUNTER (OUTPATIENT)
Dept: RADIATION ONCOLOGY | Age: 70
Discharge: HOME | End: 2025-08-12
Payer: COMMERCIAL

## 2025-08-12 PROCEDURE — 77385 HC NTSTY MODUL RAD TX DLVR SMPL: CPT | Performed by: RADIOLOGY

## 2025-08-13 ENCOUNTER — TELEPHONE (OUTPATIENT)
Dept: CASE MANAGEMENT | Age: 70
End: 2025-08-13

## 2025-08-13 ENCOUNTER — HOSPITAL ENCOUNTER (OUTPATIENT)
Dept: RADIATION ONCOLOGY | Age: 70
Discharge: HOME | End: 2025-08-13
Payer: COMMERCIAL

## 2025-08-13 VITALS
DIASTOLIC BLOOD PRESSURE: 77 MMHG | TEMPERATURE: 97.8 F | WEIGHT: 177.9 LBS | RESPIRATION RATE: 18 BRPM | BODY MASS INDEX: 27.86 KG/M2 | SYSTOLIC BLOOD PRESSURE: 130 MMHG | OXYGEN SATURATION: 98 % | HEART RATE: 78 BPM

## 2025-08-13 DIAGNOSIS — C61 PROSTATE CANCER (HCC): Primary | ICD-10-CM

## 2025-08-13 PROCEDURE — 77385 HC NTSTY MODUL RAD TX DLVR SMPL: CPT | Performed by: RADIOLOGY

## 2025-08-14 ENCOUNTER — HOSPITAL ENCOUNTER (OUTPATIENT)
Dept: RADIATION ONCOLOGY | Age: 70
Discharge: HOME | End: 2025-08-14
Payer: COMMERCIAL

## 2025-08-14 PROCEDURE — 77385 HC NTSTY MODUL RAD TX DLVR SMPL: CPT | Performed by: RADIOLOGY

## 2025-08-15 ENCOUNTER — HOSPITAL ENCOUNTER (OUTPATIENT)
Dept: RADIATION ONCOLOGY | Age: 70
Discharge: HOME | End: 2025-08-15
Payer: COMMERCIAL

## 2025-08-15 PROCEDURE — 77385 HC NTSTY MODUL RAD TX DLVR SMPL: CPT | Performed by: RADIOLOGY

## 2025-08-15 PROCEDURE — 77336 RADIATION PHYSICS CONSULT: CPT | Performed by: RADIOLOGY

## 2025-08-18 ENCOUNTER — HOSPITAL ENCOUNTER (OUTPATIENT)
Dept: RADIATION ONCOLOGY | Age: 70
Discharge: HOME | End: 2025-08-18
Payer: COMMERCIAL

## 2025-08-18 PROCEDURE — 77014 CHG CT GUIDANCE RADIATION THERAPY FLDS PLACEMENT: CPT | Performed by: RADIOLOGY

## 2025-08-18 PROCEDURE — 77385 HC NTSTY MODUL RAD TX DLVR SMPL: CPT | Performed by: RADIOLOGY

## 2025-08-19 ENCOUNTER — HOSPITAL ENCOUNTER (OUTPATIENT)
Dept: RADIATION ONCOLOGY | Age: 70
Discharge: HOME | End: 2025-08-19
Payer: COMMERCIAL

## 2025-08-19 PROCEDURE — 77385 HC NTSTY MODUL RAD TX DLVR SMPL: CPT | Performed by: RADIOLOGY

## 2025-08-20 ENCOUNTER — HOSPITAL ENCOUNTER (OUTPATIENT)
Dept: RADIATION ONCOLOGY | Age: 70
Discharge: HOME | End: 2025-08-20
Payer: COMMERCIAL

## 2025-08-20 VITALS
HEART RATE: 74 BPM | OXYGEN SATURATION: 98 % | WEIGHT: 178 LBS | TEMPERATURE: 97.6 F | RESPIRATION RATE: 18 BRPM | DIASTOLIC BLOOD PRESSURE: 81 MMHG | BODY MASS INDEX: 27.88 KG/M2 | SYSTOLIC BLOOD PRESSURE: 165 MMHG

## 2025-08-20 DIAGNOSIS — C61 PROSTATE CANCER (HCC): Primary | ICD-10-CM

## 2025-08-20 PROCEDURE — 77385 HC NTSTY MODUL RAD TX DLVR SMPL: CPT | Performed by: RADIOLOGY

## 2025-08-21 ENCOUNTER — HOSPITAL ENCOUNTER (OUTPATIENT)
Dept: RADIATION ONCOLOGY | Age: 70
Discharge: HOME | End: 2025-08-21
Payer: COMMERCIAL

## 2025-08-21 PROCEDURE — 77385 HC NTSTY MODUL RAD TX DLVR SMPL: CPT | Performed by: RADIOLOGY

## 2025-08-22 ENCOUNTER — HOSPITAL ENCOUNTER (OUTPATIENT)
Dept: RADIATION ONCOLOGY | Age: 70
Discharge: HOME | End: 2025-08-22
Payer: COMMERCIAL

## 2025-08-22 PROCEDURE — 77336 RADIATION PHYSICS CONSULT: CPT | Performed by: RADIOLOGY

## 2025-08-22 PROCEDURE — 77385 HC NTSTY MODUL RAD TX DLVR SMPL: CPT | Performed by: RADIOLOGY

## 2025-08-25 ENCOUNTER — HOSPITAL ENCOUNTER (OUTPATIENT)
Dept: RADIATION ONCOLOGY | Age: 70
Discharge: HOME | End: 2025-08-25
Payer: COMMERCIAL

## 2025-08-25 PROCEDURE — 77014 CHG CT GUIDANCE RADIATION THERAPY FLDS PLACEMENT: CPT | Performed by: RADIOLOGY

## 2025-08-25 PROCEDURE — 77385 HC NTSTY MODUL RAD TX DLVR SMPL: CPT | Performed by: RADIOLOGY

## 2025-08-26 ENCOUNTER — HOSPITAL ENCOUNTER (OUTPATIENT)
Dept: RADIATION ONCOLOGY | Age: 70
Discharge: HOME | End: 2025-08-26
Payer: COMMERCIAL

## 2025-08-26 PROCEDURE — 77385 HC NTSTY MODUL RAD TX DLVR SMPL: CPT | Performed by: RADIOLOGY

## 2025-08-26 PROCEDURE — 77014 CHG CT GUIDANCE RADIATION THERAPY FLDS PLACEMENT: CPT | Performed by: RADIOLOGY

## 2025-08-27 ENCOUNTER — HOSPITAL ENCOUNTER (OUTPATIENT)
Dept: RADIATION ONCOLOGY | Age: 70
Discharge: HOME | End: 2025-08-27
Payer: COMMERCIAL

## 2025-08-27 VITALS
HEART RATE: 78 BPM | TEMPERATURE: 97.3 F | SYSTOLIC BLOOD PRESSURE: 171 MMHG | RESPIRATION RATE: 18 BRPM | DIASTOLIC BLOOD PRESSURE: 74 MMHG | OXYGEN SATURATION: 98 % | WEIGHT: 180.9 LBS | BODY MASS INDEX: 28.33 KG/M2

## 2025-08-27 DIAGNOSIS — C61 PROSTATE CANCER (HCC): Primary | ICD-10-CM

## 2025-08-27 PROCEDURE — 77385 HC NTSTY MODUL RAD TX DLVR SMPL: CPT | Performed by: RADIOLOGY

## 2025-08-27 PROCEDURE — 77014 CHG CT GUIDANCE RADIATION THERAPY FLDS PLACEMENT: CPT | Performed by: RADIOLOGY

## 2025-08-27 ASSESSMENT — PAIN SCALES - GENERAL: PAINLEVEL_OUTOF10: 0
